# Patient Record
Sex: FEMALE | Race: WHITE | NOT HISPANIC OR LATINO | Employment: OTHER | URBAN - METROPOLITAN AREA
[De-identification: names, ages, dates, MRNs, and addresses within clinical notes are randomized per-mention and may not be internally consistent; named-entity substitution may affect disease eponyms.]

---

## 2022-08-23 ENCOUNTER — HOSPITAL ENCOUNTER (OUTPATIENT)
Dept: GENERAL RADIOLOGY | Facility: HOSPITAL | Age: 77
Discharge: HOME | End: 2022-08-23
Admitting: THORACIC SURGERY (CARDIOTHORACIC VASCULAR SURGERY)

## 2022-08-23 ENCOUNTER — TRANSCRIBE ORDERS (OUTPATIENT)
Dept: GENERAL RADIOLOGY | Facility: HOSPITAL | Age: 77
End: 2022-08-23

## 2022-08-23 DIAGNOSIS — Z09 SURGERY FOLLOW-UP: ICD-10-CM

## 2022-08-23 DIAGNOSIS — Z09 SURGERY FOLLOW-UP: Primary | ICD-10-CM

## 2022-08-23 PROCEDURE — 71046 X-RAY EXAM CHEST 2 VIEWS: CPT

## 2023-10-26 ENCOUNTER — OFFICE VISIT (OUTPATIENT)
Dept: CARDIOLOGY | Facility: CLINIC | Age: 78
End: 2023-10-26
Payer: MEDICARE

## 2023-10-26 VITALS
OXYGEN SATURATION: 94 % | SYSTOLIC BLOOD PRESSURE: 142 MMHG | HEIGHT: 59 IN | WEIGHT: 144.8 LBS | HEART RATE: 94 BPM | BODY MASS INDEX: 29.19 KG/M2 | DIASTOLIC BLOOD PRESSURE: 77 MMHG

## 2023-10-26 DIAGNOSIS — R60.0 BILATERAL LOWER EXTREMITY EDEMA: ICD-10-CM

## 2023-10-26 DIAGNOSIS — R07.2 PRECORDIAL PAIN: ICD-10-CM

## 2023-10-26 DIAGNOSIS — R06.09 DYSPNEA ON EXERTION: Primary | ICD-10-CM

## 2023-10-26 PROCEDURE — 1159F MED LIST DOCD IN RCRD: CPT | Performed by: PHYSICIAN ASSISTANT

## 2023-10-26 PROCEDURE — 99203 OFFICE O/P NEW LOW 30 MIN: CPT | Performed by: PHYSICIAN ASSISTANT

## 2023-10-26 PROCEDURE — 1160F RVW MEDS BY RX/DR IN RCRD: CPT | Performed by: PHYSICIAN ASSISTANT

## 2023-10-26 PROCEDURE — 93000 ELECTROCARDIOGRAM COMPLETE: CPT | Performed by: PHYSICIAN ASSISTANT

## 2023-10-26 RX ORDER — CLONAZEPAM 0.5 MG/1
0.25 TABLET ORAL DAILY PRN
COMMUNITY

## 2023-10-26 RX ORDER — FLUTICASONE PROPIONATE 50 MCG
2 SPRAY, SUSPENSION (ML) NASAL DAILY
COMMUNITY

## 2023-10-26 RX ORDER — CETIRIZINE HYDROCHLORIDE 10 MG/1
10 TABLET ORAL DAILY
COMMUNITY

## 2023-10-26 RX ORDER — SUCRALFATE 1 G/1
1 TABLET ORAL EVERY 6 HOURS
COMMUNITY
Start: 2023-09-26

## 2023-10-26 RX ORDER — MONTELUKAST SODIUM 10 MG/1
10 TABLET ORAL
COMMUNITY

## 2023-10-26 RX ORDER — LEVOTHYROXINE SODIUM 0.07 MG/1
75 TABLET ORAL DAILY
COMMUNITY

## 2023-10-26 RX ORDER — ROFLUMILAST 500 UG/1
500 TABLET ORAL DAILY
COMMUNITY

## 2023-10-26 RX ORDER — PANTOPRAZOLE SODIUM 40 MG/1
40 TABLET, DELAYED RELEASE ORAL DAILY
COMMUNITY

## 2023-10-26 RX ORDER — FLUTICASONE FUROATE AND VILANTEROL 200; 25 UG/1; UG/1
1 POWDER RESPIRATORY (INHALATION) DAILY
COMMUNITY

## 2023-10-26 NOTE — PROGRESS NOTES
Subjective   Rosaura Mario is a 78 y.o. female     Chief Complaint   Patient presents with    Establish Care     Cardiac eval    Edema     Lower extremities    Chest Pain       HPI    The patient presents in the clinic today for establishment of cardiovascular care.  She has had a difficult clinical course as of recent.  She apparently had admission recently through the [facility] for what eventually was noted to be subarachnoid hemorrhage.  Prior to presentation, the patient had severe, intensifying headache.  She had associated nausea and vomiting.  She had left lower extremity weakness.  CT of the head at her local hospital suggested subarachnoid hemorrhage.  She was taken to  for neurosurgical consultation.  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  Her course was eventually complicated with pneumonia, treated accordingly.  She eventually had discharge with intense outpatient rehab.  During hospitalization, significant edema was noted.  She eventually was evaluated with an echocardiogram.  Echocardiogram supported preserved systolic function with no significant valvular nor structural abnormalities.  Right-sided parameters were very much unremarkable, with pulmonary pressures reported at 34 mmHg otherwise.  Lower extremity venous duplex studies supported no DVT or significant abnormalities to bilateral lower extremities, as the majority of her edema at the time involved the same.  She eventually was discharged.  She has had persistent lower extremity edema and this has prompted referral today.  She denies associated PND nor orthopnea.  Baseline dyspnea is rather severe, but felt secondary to overall deconditioning.  She will have some degree of chest discomfort at times, which she reports is very minimal.  She did have a recent CT of the chest  which indicated no PE.  No acute pathology was noted otherwise.  EKG today indicates no acute changes.  She has no further complaints and is referred for further evaluation of edema and minimal symptoms otherwise.    Current Outpatient Medications   Medication Sig Dispense Refill    cetirizine (zyrTEC) 10 MG tablet Take 1 tablet by mouth Daily.      clonazePAM (KlonoPIN) 0.5 MG tablet Take 0.5 tablets by mouth Daily As Needed.      fluticasone (FLONASE) 50 MCG/ACT nasal spray 2 sprays by Each Nare route Daily.      Fluticasone Furoate-Vilanterol (BREO ELLIPTA) 200-25 MCG/ACT inhaler Inhale 1 puff Daily.      levothyroxine (SYNTHROID, LEVOTHROID) 75 MCG tablet Take 1 tablet by mouth Daily.      montelukast (SINGULAIR) 10 MG tablet Take 1 tablet by mouth every night at bedtime.      pantoprazole (PROTONIX) 40 MG EC tablet Take 1 tablet by mouth Daily.      roflumilast (DALIRESP) 500 MCG tablet tablet Take 1 tablet by mouth Daily.      sucralfate (CARAFATE) 1 g tablet Take 1 tablet by mouth Every 6 (Six) Hours.      furosemide (LASIX) 40 MG tablet Take 1 tablet by mouth Daily As Needed (daily as needed for increased edema/ swelling). 30 tablet 2    potassium chloride 10 MEQ CR tablet Take 1 tablet by mouth Daily As Needed (when takes lasix  only). 30 tablet 2     No current facility-administered medications for this visit.       Codeine, Sertraline, and Sulfa antibiotics    Past Medical History:   Diagnosis Date    Aneurysm     Cancer     COPD (chronic obstructive pulmonary disease)     Sleep apnea        Social History     Socioeconomic History    Marital status: Single   Tobacco Use    Smoking status: Former     Packs/day: 1.00     Years: 64.00     Additional pack years: 0.00     Total pack years: 64.00     Types: Cigarettes     Quit date: 2022     Years since quittin.9    Smokeless tobacco: Never   Substance and Sexual Activity    Alcohol use: Never    Drug use: Never    Sexual activity: Defer       Family  "History   Problem Relation Age of Onset    Cancer Mother     Heart disease Father        Review of Systems   Constitutional:  Positive for fatigue. Negative for chills, diaphoresis and fever.   HENT: Negative.     Eyes: Negative.  Negative for visual disturbance.   Respiratory:  Positive for apnea and shortness of breath. Negative for cough, chest tightness and wheezing.    Cardiovascular:  Positive for chest pain and leg swelling.   Gastrointestinal: Negative.  Negative for abdominal pain and blood in stool.   Endocrine: Negative.    Genitourinary: Negative.  Negative for hematuria.   Musculoskeletal:  Positive for neck pain. Negative for arthralgias, back pain, myalgias and neck stiffness.   Skin: Negative.  Negative for rash and wound.   Allergic/Immunologic: Positive for environmental allergies (seasonal). Negative for food allergies.   Neurological:  Positive for headaches. Negative for dizziness, syncope, weakness, light-headedness and numbness.   Hematological:  Bruises/bleeds easily.   Psychiatric/Behavioral:  Positive for sleep disturbance.        Objective     Vitals:    10/26/23 1547   BP: 142/77   BP Location: Left arm   Patient Position: Sitting   Pulse: 94   SpO2: 94%   Weight: 65.7 kg (144 lb 12.8 oz)   Height: 149.9 cm (59\")        /77 (BP Location: Left arm, Patient Position: Sitting)   Pulse 94   Ht 149.9 cm (59\")   Wt 65.7 kg (144 lb 12.8 oz)   SpO2 94%   BMI 29.25 kg/m²      Lab Results (most recent)       None            Physical Exam  Vitals and nursing note reviewed.   Constitutional:       General: She is not in acute distress.     Appearance: She is well-developed.   HENT:      Head: Normocephalic and atraumatic.   Eyes:      Conjunctiva/sclera: Conjunctivae normal.      Pupils: Pupils are equal, round, and reactive to light.   Neck:      Vascular: No JVD.      Trachea: No tracheal deviation.   Cardiovascular:      Rate and Rhythm: Normal rate and regular rhythm.      Heart " sounds: Normal heart sounds. Heart sounds are distant.   Pulmonary:      Effort: Pulmonary effort is normal.      Breath sounds: Normal breath sounds.   Abdominal:      General: Bowel sounds are normal. There is no distension.      Palpations: Abdomen is soft. There is no mass.      Tenderness: There is no abdominal tenderness. There is no guarding or rebound.   Musculoskeletal:         General: No tenderness or deformity. Normal range of motion.      Cervical back: Normal range of motion and neck supple.      Right lower le+      Left lower le+   Skin:     General: Skin is warm and dry.      Coloration: Skin is not pale.      Findings: No erythema or rash.   Neurological:      Mental Status: She is alert and oriented to person, place, and time.   Psychiatric:         Behavior: Behavior normal.         Thought Content: Thought content normal.         Judgment: Judgment normal.         Procedure     ECG 12 Lead    Date/Time: 10/26/2023 4:16 PM  Performed by: Eliezer Leong PA    Authorized by: Eliezer Leong PA  Comparison: not compared with previous ECG   Comments: Sinus rhythm, rate 89, normal axis, no acute changes noted.               Assessment & Plan      Diagnosis Plan   1. Dyspnea on exertion  ECG 12 Lead      2. Bilateral lower extremity edema        3. Precordial pain            1.  The patient presents primarily for further evaluation of bilateral lower extremity edema and some degree of symptoms otherwise as above.  We have reviewed her recent hospitalization through [facility] last December.  She was evaluated for edema apparently at that time.  Echocardiogram findings and bilateral lower extremity venous duplex studies were all unremarkable as above.  In this setting, I do not feel further evaluation is indicated for the same.  I feel that there is at least some degree of venous insufficiency present.    2.  In this setting, we have discussed physical maneuvers to address the  same.  We have reviewed consideration for compression hose therapy, if family members can aid in that.  We have discussed elevation, etc.    3.  We will give her as needed Lasix with potassium supplementation, low-dose.  She will have Lasix 40 mg with potassium 10 mEq to utilize only as needed.    4.  We did discuss consideration for ischemia assessment given history of chest pain.  Recent CT of the chest was negative for PE.  EKG today indicates no acute changes.  She would prefer to hold on further evaluation in that regard.  For recurrent symptoms she will contact us.    5.  We will continue medications otherwise without change.    6.  I would like to see her back within 3 to 4 weeks to evaluate response to physical maneuvers and diuretic therapy.  We can reevaluate at that time.         Advance Care Planning   ACP discussion was held with the patient during this visit. Patient does not have an advance directive, declines further assistance.         Electronically signed by:

## 2023-10-27 ENCOUNTER — TELEPHONE (OUTPATIENT)
Dept: CARDIOLOGY | Facility: CLINIC | Age: 78
End: 2023-10-27
Payer: MEDICARE

## 2023-10-27 NOTE — TELEPHONE ENCOUNTER
Caller: MONTEZ HERNANDEZ    Relationship: Emergency Contact    Best call back number: [phone]    What is the best time to reach you: ANYTIME    Who are you requesting to speak with (clinical staff, provider,  specific staff member): CLINICAL        What was the call regarding: PATIENTS DAUGHTER, MONTEZ HERNANDEZ CALL IN STATING SHE HAD PATIENT IN OFFICE ON 10-26-23 AND DESHAUN CARSON WAS GONNA CALL IN LASIX FOR PATIENT.  PHARMACY DOES NOT HAVE PRESCRIPTION YET. PLEASE REACH OUT TO PATIENT    Is it okay if the provider responds through MyChart: NO PLEASE CALL

## 2023-11-01 RX ORDER — POTASSIUM CHLORIDE 750 MG/1
10 TABLET, FILM COATED, EXTENDED RELEASE ORAL DAILY PRN
Qty: 30 TABLET | Refills: 2 | Status: SHIPPED | OUTPATIENT
Start: 2023-11-01

## 2023-11-01 RX ORDER — FUROSEMIDE 40 MG/1
40 TABLET ORAL DAILY PRN
Qty: 30 TABLET | Refills: 2 | Status: SHIPPED | OUTPATIENT
Start: 2023-11-01

## 2023-11-01 NOTE — TELEPHONE ENCOUNTER
Called and spoke with caregiver/ daughter sera who is on HIPAA, denies any hyperkalemia, potassium was 4.3 on recent labs. Informed her of recommedation's for lasix and potassium rx submitted to pharmacy.    96

## 2024-03-07 ENCOUNTER — TELEPHONE (OUTPATIENT)
Dept: CARDIOLOGY | Facility: CLINIC | Age: 79
End: 2024-03-07
Payer: MEDICARE

## 2024-03-07 NOTE — TELEPHONE ENCOUNTER
Received cardiac clearance request from Dr. Dragan Gaming stating pt has EGD and Colonoscopy pending clearance and is requiring a cardiac clearance. Placed cardiac clearance request in Darcy' inbox to review and address with provider.

## 2024-07-01 ENCOUNTER — TELEPHONE (OUTPATIENT)
Dept: CARDIOLOGY | Facility: CLINIC | Age: 79
End: 2024-07-01
Payer: MEDICARE

## 2024-07-01 NOTE — TELEPHONE ENCOUNTER
Called to reschedule cancelled appt and pts daughter listed on HIPAA stated that they would call back to reschedule when they were available